# Patient Record
(demographics unavailable — no encounter records)

---

## 2025-02-12 NOTE — REVIEW OF SYSTEMS
[Pain/Numbness of Digits] : pain/numbness of digits [Blurred Vision] : no blurred vision [Dysphagia] : no dysphagia [Neck Pain] : no neck pain [Dysphonia] : no dysphonia [Chest Pain] : no chest pain [Shortness Of Breath] : no shortness of breath [Nausea] : no nausea [Constipation] : no constipation [Abdominal Pain] : no abdominal pain [Diarrhea] : no diarrhea [Polyuria] : no polyuria [Nocturia] : no nocturia [Polydipsia] : no polydipsia [FreeTextEntry2] : weight stable [FreeTextEntry9] : Left leg pains - recently pulled muscle

## 2025-02-12 NOTE — ASSESSMENT
[FreeTextEntry1] : 55 year old female with:   1. T2DM - A1c down to 7.1%, improved with meds and dietary changes -cont  Metformin ER to 500 mg, 2 tabs BID. -cont Onglyza 5 mg daily - cont dietary changes -Repeat A1c 1 week before next visit -Follow-up with opthalmology for annual diabetic eye exam. - B12 level ok on MFN  2. Hyperlipidemia - LDL improved -Continue statin and lifetsyle changes  3. Vitamin D deficiency- resolved. -Continue vitamin D 1000 IU daily.  4. Obesity - losing weight!! -Continue with lifestyle modifications as above.   5. small thyroid nodules, colloid cysts up to 3 mm, normal thyroid - repeat sonogram as needed  6. elevated AST/ALT - likely MAFLD, enzymes improved with bolivar loss

## 2025-02-12 NOTE — DATA REVIEWED
[FreeTextEntry1] : EXAM: [id number] - US THYROID AND PARATHYROID  - ORDERED BY: FRANK SCHRADER PROCEDURE DATE:  04/14/2023 INTERPRETATION:  CLINICAL INFORMATION: Thyroid nodules. COMPARISON: Thyroid ultrasound dated 9/9/2021 and 8/25/2020 TECHNIQUE: Sonography of the thyroid. FINDINGS: Right Lobe: 5.1 x 2.0 x 1.4 cm. Normal in size and echogenicity. There are scattered subcentimeter colloid cysts measuring up to 3 mm. (TIRAD -1). Left Lobe: 5.7 x 2.2 x 2.0 cm. Normal in size and echogenicity. There is 4 mm benign colloid cyst in the lower pole, unchanged (TIRAD -1). Isthmus: 2 mm. Cervical Lymph Nodes: No enlarged or abnormal morphology cervical nodes. IMPRESSION: Bilateral subcentimeter benign-appearing thyroid nodules. There are no suspicious lesions. TI-RAD 1: Benign (No FNA) ==================================================================== Labs 9/13/23 CBC wnl Gluc 148, A1c 7.2 Cr 0.53.  LDL 61, HDL 55, Trig 68 urine alb/cr neg TSH 2.940 vit D 29, B12 554  Labs 2/28/24 Gluc 159, A1c 8.1 Cr 0.52,  LDL 69, HDL 54, Trig 85  Labs 9/25/24 Gluc 193, A1c 9.4 Cr 0.56,  AST 56 ALT 68 LDL 81, HDL 46, Trig 103 urine alb/cr neg  labs 2/5/25 CBC wnl Gluc 113, A1c 7.1 Cr 0.59,  LDL 62, HDL 54, Trig 87 urine alb/cr neg TSH 2.070 B12 543

## 2025-02-12 NOTE — HISTORY OF PRESENT ILLNESS
[FreeTextEntry1] : INTERVAL HISTORY: losing weight, adherent with onglyza, watching diet -eating healthier and smaller portions SMBG testing FS daily 120's by history  Quality: Type 2 Severity: mild Duration: 2011 Onset: routine blood test  ASSOCIATED SYMPTOMS/COMPLICATIONS: eye exam 2022 no DR per pt, (+) peripheral neuropathy, 2025 neg urine alb/Cr neg  MODIFYING FACTORS: worsening due to diet and stress,  stopped farxiga since 12/2022 due to insurance/cost  Current DM meds: Metformin  mg 2 tabs BID, Onglyza 5 mg daily

## 2025-02-12 NOTE — PHYSICAL EXAM
[Alert] : alert [Obese] : obese [No Acute Distress] : no acute distress [Normal Sclera/Conjunctiva] : normal sclera/conjunctiva [EOMI] : extra ocular movement intact [Thyroid Not Enlarged] : the thyroid was not enlarged [No Thyroid Nodules] : no palpable thyroid nodules [Normal Rate and Effort] : normal respiratory rate and effort [Clear to Auscultation] : lungs were clear to auscultation bilaterally [Normal S1, S2] : normal S1 and S2 [Normal Rate] : heart rate was normal [No Edema] : no peripheral edema [Not Tender] : non-tender [Soft] : abdomen soft [2+] : 2+ in the dorsalis pedis [Oriented x3] : oriented to person, place, and time [Normal Affect] : the affect was normal [Normal Mood] : the mood was normal [Vibration Dec.] : normal vibratory sensation at the level of the toes [Diminished Throughout Both Feet] : normal tactile sensation with monofilament testing throughout both feet [Acanthosis Nigricans] : no acanthosis nigricans

## 2025-07-23 NOTE — DATA REVIEWED
[FreeTextEntry1] : EXAM: [id number] - US THYROID AND PARATHYROID  - ORDERED BY: FRANK SCHRADER PROCEDURE DATE:  04/14/2023 INTERPRETATION:  CLINICAL INFORMATION: Thyroid nodules. COMPARISON: Thyroid ultrasound dated 9/9/2021 and 8/25/2020 TECHNIQUE: Sonography of the thyroid. FINDINGS: Right Lobe: 5.1 x 2.0 x 1.4 cm. Normal in size and echogenicity. There are scattered subcentimeter colloid cysts measuring up to 3 mm. (TIRAD -1). Left Lobe: 5.7 x 2.2 x 2.0 cm. Normal in size and echogenicity. There is 4 mm benign colloid cyst in the lower pole, unchanged (TIRAD -1). Isthmus: 2 mm. Cervical Lymph Nodes: No enlarged or abnormal morphology cervical nodes. IMPRESSION: Bilateral subcentimeter benign-appearing thyroid nodules. There are no suspicious lesions. TI-RAD 1: Benign (No FNA) ==================================================================== Labs 9/13/23 CBC wnl Gluc 148, A1c 7.2 Cr 0.53.  LDL 61, HDL 55, Trig 68 urine alb/cr neg TSH 2.940 vit D 29, B12 554  Labs 2/28/24 Gluc 159, A1c 8.1 Cr 0.52,  LDL 69, HDL 54, Trig 85  Labs 9/25/24 Gluc 193, A1c 9.4 Cr 0.56,  AST 56 ALT 68 LDL 81, HDL 46, Trig 103 urine alb/cr neg  labs 2/5/25 CBC wnl Gluc 113, A1c 7.1 Cr 0.59,  LDL 62, HDL 54, Trig 87 urine alb/cr neg TSH 2.070 B12 543  Labs 7/14/25 Gluc 111, A1c 6.2 Cr 0.57  LDL 63, HDL 50, Trig 73 AST and ALT nml

## 2025-07-23 NOTE — REVIEW OF SYSTEMS
[Pain/Numbness of Digits] : pain/numbness of digits [Recent Weight Loss (___ Lbs)] : recent weight loss: [unfilled] lbs [Blurred Vision] : no blurred vision [Dysphagia] : no dysphagia [Neck Pain] : no neck pain [Dysphonia] : no dysphonia [Chest Pain] : no chest pain [Shortness Of Breath] : no shortness of breath [Nausea] : no nausea [Constipation] : no constipation [Abdominal Pain] : no abdominal pain [Diarrhea] : no diarrhea [Polyuria] : no polyuria [Nocturia] : no nocturia [Polydipsia] : no polydipsia

## 2025-07-23 NOTE — ASSESSMENT
[FreeTextEntry1] : 55 year old female with:   1. T2DM - A1c down to 6.2%, improved with meds and dietary changes - cont Metformin ER to 500 mg 2 tabs BID. - cont Onglyza 5 mg daily - cont lifestyle changes - Repeat A1c 1 week before next visit - Follow-up with opthalmology for annual diabetic eye exam. - B12 level ok on MFN  2. Hyperlipidemia - LDL improved -Continue statin and lifetsyle changes  3. Vitamin D deficiency- resolved. -Continue vitamin D 1000 IU daily.  4. Obesity - losing weight!! BMI 38 --> 32 -Continue with lifestyle modifications as above.   5. small thyroid nodules, colloid cysts up to 3 mm, normal thyroid - repeat sonogram as needed  6. elevated AST/ALT - likely MAFLD, enzymes improved with bolivar loss

## 2025-07-23 NOTE — PHYSICAL EXAM
[Alert] : alert [Obese] : obese [No Acute Distress] : no acute distress [Normal Sclera/Conjunctiva] : normal sclera/conjunctiva [EOMI] : extra ocular movement intact [Thyroid Not Enlarged] : the thyroid was not enlarged [No Thyroid Nodules] : no palpable thyroid nodules [Normal Rate and Effort] : normal respiratory rate and effort [Clear to Auscultation] : lungs were clear to auscultation bilaterally [Normal S1, S2] : normal S1 and S2 [Normal Rate] : heart rate was normal [No Edema] : no peripheral edema [Not Tender] : non-tender [Soft] : abdomen soft [Oriented x3] : oriented to person, place, and time [Normal Affect] : the affect was normal [Normal Mood] : the mood was normal [Acanthosis Nigricans] : no acanthosis nigricans

## 2025-07-23 NOTE — HISTORY OF PRESENT ILLNESS
[FreeTextEntry1] : INTERVAL HISTORY: still losing weight, adherent with onglyza, watching diet -eating healthier and smaller portions SMBG testing FS 1x daily, per pt 115-150's  Quality: Type 2 Severity: mild Duration: 2011 Onset: routine blood test  ASSOCIATED SYMPTOMS/COMPLICATIONS: eye exam 2022 no DR per pt, (+) peripheral neuropathy, 2025 neg urine alb/Cr neg  MODIFYING FACTORS: worsening due to diet and stress,  stopped farxiga since 12/2022 due to insurance/cost  Current DM meds: Metformin  mg 2 tabs BID, Onglyza 5 mg daily